# Patient Record
Sex: MALE | URBAN - METROPOLITAN AREA
[De-identification: names, ages, dates, MRNs, and addresses within clinical notes are randomized per-mention and may not be internally consistent; named-entity substitution may affect disease eponyms.]

---

## 2024-07-23 ENCOUNTER — TELEPHONE (OUTPATIENT)
Dept: ORTHOPEDIC SURGERY | Age: 59
End: 2024-07-23

## 2024-08-23 ENCOUNTER — OFFICE VISIT (OUTPATIENT)
Dept: ORTHOPEDIC SURGERY | Age: 59
End: 2024-08-23

## 2024-08-23 VITALS — WEIGHT: 311.2 LBS | HEIGHT: 74 IN | BODY MASS INDEX: 39.94 KG/M2

## 2024-08-23 DIAGNOSIS — M25.561 PAIN IN BOTH KNEES, UNSPECIFIED CHRONICITY: Primary | ICD-10-CM

## 2024-08-23 DIAGNOSIS — M25.562 PAIN IN BOTH KNEES, UNSPECIFIED CHRONICITY: Primary | ICD-10-CM

## 2024-08-23 DIAGNOSIS — M17.12 ARTHRITIS OF LEFT KNEE: ICD-10-CM

## 2024-08-23 DIAGNOSIS — M17.11 ARTHRITIS OF RIGHT KNEE: ICD-10-CM

## 2024-08-23 RX ORDER — TRIAMCINOLONE ACETONIDE 40 MG/ML
40 INJECTION, SUSPENSION INTRA-ARTICULAR; INTRAMUSCULAR ONCE
Status: COMPLETED | OUTPATIENT
Start: 2024-08-23 | End: 2024-08-23

## 2024-08-23 RX ADMIN — TRIAMCINOLONE ACETONIDE 40 MG: 40 INJECTION, SUSPENSION INTRA-ARTICULAR; INTRAMUSCULAR at 09:18

## 2024-08-23 NOTE — PROGRESS NOTES
Name: Devante Noel  YOB: 1965  Gender: male  MRN: [id number]    CC:   Chief Complaint   Patient presents with    Knee Pain     Bilateral knee-getting xrays today         DIAGNOSIS:   Encounter Diagnosis   Name Primary?    Pain in both knees, unspecified chronicity Yes        HPI:   The pain has been present for years and is becoming worse.  It hurts at night when sleeping.  The pain is located over the knee.  It does hurt to walk and gets worse with increased distances.   The pain does not radiate down the leg.  Numbness and tingling are not noted.   Treatment so far has been [facility].  [facility]    No current outpatient medications on file.  Not on File  No past medical history on file.  .pmh  No past surgical history on file.  No family history on file.  Social History     Socioeconomic History    Marital status: Single     Spouse name: Not on file    Number of children: Not on file    Years of education: Not on file    Highest education level: Not on file   Occupational History    Not on file   Tobacco Use    Smoking status: Not on file    Smokeless tobacco: Not on file   Substance and Sexual Activity    Alcohol use: Not on file    Drug use: Not on file    Sexual activity: Not on file   Other Topics Concern    Not on file   Social History Narrative    Not on file     Social Determinants of Health     Financial Resource Strain: Not on file   Food Insecurity: Not on file   Transportation Needs: Not on file   Physical Activity: Not on file   Stress: Not on file   Social Connections: Unknown (7/1/2022)    Received from [facility]    Social Connections     Frequency of Communication with Friends and Family: Not asked     Frequency of Social Gatherings with Friends and Family: Not asked   Intimate Partner Violence: Unknown (7/1/2022)    Received from [facility]    Intimate Partner Violence     Fear of Current or Ex-Partner: Not asked     Emotionally

## 2024-09-19 DIAGNOSIS — M17.12 ARTHRITIS OF LEFT KNEE: Primary | ICD-10-CM

## 2024-10-29 ENCOUNTER — PREP FOR PROCEDURE (OUTPATIENT)
Dept: ORTHOPEDIC SURGERY | Age: 59
End: 2024-10-29

## 2024-10-29 DIAGNOSIS — Z01.818 PREOP EXAMINATION: Primary | ICD-10-CM

## 2024-10-29 RX ORDER — SODIUM CHLORIDE 0.9 % (FLUSH) 0.9 %
5-40 SYRINGE (ML) INJECTION EVERY 12 HOURS SCHEDULED
OUTPATIENT
Start: 2024-10-29

## 2024-10-29 RX ORDER — SODIUM CHLORIDE 0.9 % (FLUSH) 0.9 %
5-40 SYRINGE (ML) INJECTION PRN
OUTPATIENT
Start: 2024-10-29

## 2024-10-29 RX ORDER — SODIUM CHLORIDE 9 MG/ML
INJECTION, SOLUTION INTRAVENOUS PRN
OUTPATIENT
Start: 2024-10-29

## 2025-01-09 ENCOUNTER — TELEPHONE (OUTPATIENT)
Dept: ORTHOPEDIC SURGERY | Age: 60
End: 2025-01-09

## 2025-01-15 ENCOUNTER — OFFICE VISIT (OUTPATIENT)
Dept: ORTHOPEDIC SURGERY | Age: 60
End: 2025-01-15
Payer: OTHER GOVERNMENT

## 2025-01-15 DIAGNOSIS — M17.11 ARTHRITIS OF RIGHT KNEE: ICD-10-CM

## 2025-01-15 DIAGNOSIS — M17.12 ARTHRITIS OF LEFT KNEE: Primary | ICD-10-CM

## 2025-01-15 PROCEDURE — 20611 DRAIN/INJ JOINT/BURSA W/US: CPT | Performed by: ORTHOPAEDIC SURGERY

## 2025-01-15 RX ORDER — TRIAMCINOLONE ACETONIDE 40 MG/ML
40 INJECTION, SUSPENSION INTRA-ARTICULAR; INTRAMUSCULAR ONCE
Status: COMPLETED | OUTPATIENT
Start: 2025-01-15 | End: 2025-01-15

## 2025-01-15 RX ADMIN — TRIAMCINOLONE ACETONIDE 40 MG: 40 INJECTION, SUSPENSION INTRA-ARTICULAR; INTRAMUSCULAR at 14:20

## 2025-01-15 NOTE — PROGRESS NOTES
Name: Devante Noel  YOB: 1965  Gender: male  MRN: [id number]      No current outpatient medications on file.  Not on File    CC: Bilateral knee pain    Impression: Bilateral knee osteoarthritis    Diagnosis:   Encounter Diagnoses   Name Primary?    Arthritis of left knee Yes    Arthritis of right knee         Procedure: Kenalog injection with US guidance    Radiology Report:  Lema21 US unit with a variable frequency (6.0-15.0 MHz) linear transducer was used to examine the intracondylar notch, retropatellar fat pad, patella tendon, patella, and tibia as well as to ensure adequate needle placement.  Injection image was obtained and placed in the patient's permanent chart.    Procedure Note:  Time out was performed which included identifying the patient by name and date of birth.  The procedure site was identified with all present in agreement.  Bilateral knees were prepped with alcohol. Under GE ultrasound guidance, the knees were sequentially injected with 2 mL of 0.5% Marcaine and 40 mg Kenalog. The patient tolerated the procedure without difficulty.    Disposition: They are to return as scheduled.    He was scheduled for surgery.  He had to cancel because of social issues.  Left knee is probably worse than the right